# Patient Record
Sex: MALE
[De-identification: names, ages, dates, MRNs, and addresses within clinical notes are randomized per-mention and may not be internally consistent; named-entity substitution may affect disease eponyms.]

---

## 2024-03-28 ENCOUNTER — NON-APPOINTMENT (OUTPATIENT)
Age: 59
End: 2024-03-28

## 2024-03-28 DIAGNOSIS — Z87.891 PERSONAL HISTORY OF NICOTINE DEPENDENCE: ICD-10-CM

## 2024-03-28 PROBLEM — Z00.00 ENCOUNTER FOR PREVENTIVE HEALTH EXAMINATION: Status: ACTIVE | Noted: 2024-03-28

## 2024-03-28 RX ORDER — ECONAZOLE NITRATE 10 MG/G
1 CREAM TOPICAL
Refills: 0 | Status: ACTIVE | COMMUNITY

## 2024-04-25 ENCOUNTER — APPOINTMENT (OUTPATIENT)
Dept: PODIATRY | Facility: CLINIC | Age: 59
End: 2024-04-25

## 2024-05-30 ENCOUNTER — APPOINTMENT (OUTPATIENT)
Dept: PODIATRY | Facility: CLINIC | Age: 59
End: 2024-05-30

## 2024-06-06 ENCOUNTER — APPOINTMENT (OUTPATIENT)
Dept: PODIATRY | Facility: CLINIC | Age: 59
End: 2024-06-06
Payer: MEDICARE

## 2024-06-06 VITALS — WEIGHT: 230 LBS | BODY MASS INDEX: 32.93 KG/M2 | HEIGHT: 70 IN

## 2024-06-06 DIAGNOSIS — M79.675 PAIN IN LEFT TOE(S): ICD-10-CM

## 2024-06-06 DIAGNOSIS — M79.674 PAIN IN RIGHT TOE(S): ICD-10-CM

## 2024-06-06 DIAGNOSIS — B35.3 TINEA PEDIS: ICD-10-CM

## 2024-06-06 DIAGNOSIS — B35.1 TINEA UNGUIUM: ICD-10-CM

## 2024-06-06 PROCEDURE — 99213 OFFICE O/P EST LOW 20 MIN: CPT | Mod: 25

## 2024-06-06 PROCEDURE — 11721 DEBRIDE NAIL 6 OR MORE: CPT

## 2024-06-10 PROBLEM — B35.3 TINEA PEDIS OF BOTH FEET: Status: ACTIVE | Noted: 2024-03-28

## 2024-06-10 PROBLEM — M79.674 PAIN OF TOE OF RIGHT FOOT: Status: ACTIVE | Noted: 2024-03-28

## 2024-06-10 PROBLEM — B35.1 ONYCHOMYCOSIS DUE TO TRICHOPHYTON RUBRUM: Status: ACTIVE | Noted: 2024-03-28

## 2024-06-10 PROBLEM — M79.675 PAIN OF TOE OF LEFT FOOT: Status: ACTIVE | Noted: 2024-03-28

## 2024-06-10 NOTE — PHYSICAL EXAM
[Ankle Swelling (On Exam)] : present [Ankle Swelling Bilaterally] : bilaterally  [Delayed in the Right Toes] : capillary refills normal in right toes [Delayed in the Left Toes] : capillary refills normal in the left toes [1+] : left foot dorsalis pedis 1+ [TextEntry] : The toenails were thick and discolored on the toes of both feet 1 - 5.  Mild subungual debris was noted on multiple toes as well as incurvation of the toenails.  Upon palpation pain was elicited.  There did not appear to be bacterial infection noted.  The pedal pulses and neurological parameters were unchanged and no other dermatological changes were noted on either foot.  There is a significant scaling rash on the bottom of both feet with red spots.  The interspaces are clear.

## 2024-06-10 NOTE — ASSESSMENT
[FreeTextEntry1] : Assessment: Onychomycosis caused by T. Rubrum. Tinea pedis infection bilaterally.  Plan: I debrided each toenail via mechanical trimming and electrical grinding.  All toenails were trimmed with a 14 cm sterile stainless steel box lock double spring nail splitter.  Then utilizing a sterile pear shaped damari mela (this device falls under bur, surgical, general & plastic surgery.  The FDA deems this item a Class-1 device) via a 35,000 RPM electric drill and vacuum and dust extractor system all toenails were aseptically debrided removing fungal layers.  This is done to diminish the fungal load of the toenails and enhance the effects of the antifungal medication, allowing overall improvement in the degree of fungal infection as well as improve appearance and reduce discomfort and help diminish chances of secondary bacterial infection, also lessening the chance of ingrown nails, especially when performed on a regular basis.  I instructed the patient to continue with the antifungal therapy provided everyday and use the Clean Sweep antifungal spray to disinfect their shoes, as continued improvement was noted.  He was encouraged to call the office with any questions or problems as they may arise.  Mark insists he has ketoconazole cream at home.  I advised him to begin using it at least once a day, preferably twice a day.  He states that he is not using it a lot.  I advised him to call if he runs out.  I advised the patient to notify the office right away if increased redness, swelling, pain, open wounds or discharge were observed.  PTR: 2 months.

## 2024-08-08 ENCOUNTER — APPOINTMENT (OUTPATIENT)
Dept: PODIATRY | Facility: CLINIC | Age: 59
End: 2024-08-08

## 2024-08-08 PROCEDURE — 99213 OFFICE O/P EST LOW 20 MIN: CPT | Mod: 25

## 2024-08-08 PROCEDURE — 11721 DEBRIDE NAIL 6 OR MORE: CPT

## 2024-08-12 NOTE — ASSESSMENT
[FreeTextEntry1] : Assessment: Onychomycosis caused by T. Rubrum. Tinea pedis infection bilaterally.  Plan: Aseptic debridement was performed on all toenails utilizing electric burring and mechanical debridement.  All toenails were trimmed with a 14 cm sterile stainless steel box lock double spring nail splitter.  Then utilizing a sterile pear shaped damari mela (this device falls under bur, surgical, general & plastic surgery.  The FDA deems this item a Class-1 device) via a 35,000 RPM electric drill and vacuum and dust extractor system all toenails were aseptically debrided removing fungal layers.  This is done to diminish the fungal load of the toenails and enhance the effects of the antifungal medication, allowing overall improvement in the degree of fungal infection as well as improve appearance and reduce discomfort and help diminish chances of secondary bacterial infection, also lessening the chance of ingrown nails, especially when performed on a regular basis.  The patient was encouraged to continue with the topical antifungal medication everyday and use the Clean Sweep antifungal spray to disinfect their shoes.  I encouraged Jonathan to use the ketoconazole cream that he has b.i.d.  He insists he has it.  He states that he just has not been using it.  I advised him to call with any questions or problems and to call if he does not have it.  PTR: 2 months.

## 2024-08-12 NOTE — HISTORY OF PRESENT ILLNESS
[FreeTextEntry1] : The patient presented to the office with a complaint of fungal nails that are irritated especially when his toes rub against the shoes.  He said that this care of the toenails enables him to walk without pain and without it he would have a greatly limited ability to walk.  He states that he has not been putting the cream on his feet and notices now that he is sitting here that they are very peely and still little red.

## 2024-08-12 NOTE — PHYSICAL EXAM
[TextEntry] : Toes 1, 2, 3, 4 and 5 right foot and toes 1, 2, 3, 4 and 5 left foot appeared to have thickened nails with discoloration. An odor indicating fungus was present.  The discoloration of the nail was brownish yellow with thickening present.  Erythema surrounded the nail plate regions.  Pain was elicited upon palpation of the toenails bilaterally.  I reviewed the ROS and no other changes in podiatric status including dermatological, orthopedic or vascular.  There is a red peeling scaling rash in a moccasin distribution on both feet.  No signs of infection are noted.

## 2024-10-24 ENCOUNTER — APPOINTMENT (OUTPATIENT)
Dept: PODIATRY | Facility: CLINIC | Age: 59
End: 2024-10-24

## 2024-11-08 ENCOUNTER — APPOINTMENT (OUTPATIENT)
Dept: PODIATRY | Facility: CLINIC | Age: 59
End: 2024-11-08

## 2024-11-14 ENCOUNTER — APPOINTMENT (OUTPATIENT)
Dept: PODIATRY | Facility: CLINIC | Age: 59
End: 2024-11-14

## 2025-01-16 ENCOUNTER — APPOINTMENT (OUTPATIENT)
Dept: PODIATRY | Facility: CLINIC | Age: 60
End: 2025-01-16
Payer: MEDICARE

## 2025-01-16 DIAGNOSIS — B35.3 TINEA PEDIS: ICD-10-CM

## 2025-01-16 DIAGNOSIS — M79.674 PAIN IN RIGHT TOE(S): ICD-10-CM

## 2025-01-16 DIAGNOSIS — B35.1 TINEA UNGUIUM: ICD-10-CM

## 2025-01-16 DIAGNOSIS — M79.675 PAIN IN LEFT TOE(S): ICD-10-CM

## 2025-01-16 PROCEDURE — 11721 DEBRIDE NAIL 6 OR MORE: CPT

## 2025-01-16 PROCEDURE — 99213 OFFICE O/P EST LOW 20 MIN: CPT | Mod: 25

## 2025-03-20 ENCOUNTER — APPOINTMENT (OUTPATIENT)
Dept: PODIATRY | Facility: CLINIC | Age: 60
End: 2025-03-20
Payer: MEDICARE

## 2025-03-20 DIAGNOSIS — B35.1 TINEA UNGUIUM: ICD-10-CM

## 2025-03-20 DIAGNOSIS — B35.3 TINEA PEDIS: ICD-10-CM

## 2025-03-20 DIAGNOSIS — M79.674 PAIN IN RIGHT TOE(S): ICD-10-CM

## 2025-03-20 DIAGNOSIS — M79.675 PAIN IN LEFT TOE(S): ICD-10-CM

## 2025-03-20 PROCEDURE — 99213 OFFICE O/P EST LOW 20 MIN: CPT | Mod: 25

## 2025-03-20 PROCEDURE — 11721 DEBRIDE NAIL 6 OR MORE: CPT

## 2025-05-29 ENCOUNTER — APPOINTMENT (OUTPATIENT)
Dept: PODIATRY | Facility: CLINIC | Age: 60
End: 2025-05-29
Payer: MEDICARE

## 2025-05-29 DIAGNOSIS — B35.1 TINEA UNGUIUM: ICD-10-CM

## 2025-05-29 DIAGNOSIS — M79.675 PAIN IN LEFT TOE(S): ICD-10-CM

## 2025-05-29 DIAGNOSIS — M79.674 PAIN IN RIGHT TOE(S): ICD-10-CM

## 2025-05-29 DIAGNOSIS — B35.3 TINEA PEDIS: ICD-10-CM

## 2025-05-29 PROCEDURE — 99213 OFFICE O/P EST LOW 20 MIN: CPT | Mod: 25

## 2025-05-29 PROCEDURE — 11721 DEBRIDE NAIL 6 OR MORE: CPT

## 2025-08-14 ENCOUNTER — APPOINTMENT (OUTPATIENT)
Dept: PODIATRY | Facility: CLINIC | Age: 60
End: 2025-08-14
Payer: MEDICARE

## 2025-08-14 DIAGNOSIS — M79.675 PAIN IN LEFT TOE(S): ICD-10-CM

## 2025-08-14 DIAGNOSIS — B35.1 TINEA UNGUIUM: ICD-10-CM

## 2025-08-14 DIAGNOSIS — M79.674 PAIN IN RIGHT TOE(S): ICD-10-CM

## 2025-08-14 PROCEDURE — 11721 DEBRIDE NAIL 6 OR MORE: CPT
